# Patient Record
Sex: MALE | Race: WHITE | Employment: FULL TIME
[De-identification: names, ages, dates, MRNs, and addresses within clinical notes are randomized per-mention and may not be internally consistent; named-entity substitution may affect disease eponyms.]

---

## 2019-09-30 ENCOUNTER — HEALTH MAINTENANCE LETTER (OUTPATIENT)
Age: 44
End: 2019-09-30

## 2021-01-15 ENCOUNTER — HEALTH MAINTENANCE LETTER (OUTPATIENT)
Age: 46
End: 2021-01-15

## 2021-05-14 ENCOUNTER — HOSPITAL ENCOUNTER (EMERGENCY)
Facility: CLINIC | Age: 46
Discharge: HOME | End: 2021-05-14
Attending: EMERGENCY MEDICINE | Admitting: EMERGENCY MEDICINE
Payer: COMMERCIAL

## 2021-05-14 ENCOUNTER — APPOINTMENT (OUTPATIENT)
Dept: GENERAL RADIOLOGY | Facility: CLINIC | Age: 46
End: 2021-05-14
Attending: EMERGENCY MEDICINE
Payer: COMMERCIAL

## 2021-05-14 VITALS
HEART RATE: 80 BPM | HEIGHT: 67 IN | WEIGHT: 170 LBS | SYSTOLIC BLOOD PRESSURE: 110 MMHG | OXYGEN SATURATION: 98 % | TEMPERATURE: 96.2 F | DIASTOLIC BLOOD PRESSURE: 76 MMHG | BODY MASS INDEX: 26.68 KG/M2 | RESPIRATION RATE: 16 BRPM

## 2021-05-14 DIAGNOSIS — R07.89 CHEST DISCOMFORT: ICD-10-CM

## 2021-05-14 LAB
ALBUMIN SERPL-MCNC: 3.9 G/DL (ref 3.4–5)
ALP SERPL-CCNC: 114 U/L (ref 40–150)
ALT SERPL W P-5'-P-CCNC: 25 U/L (ref 0–70)
ANION GAP SERPL CALCULATED.3IONS-SCNC: 5 MMOL/L (ref 3–14)
AST SERPL W P-5'-P-CCNC: 15 U/L (ref 0–45)
BASOPHILS # BLD AUTO: 0 10E9/L (ref 0–0.2)
BASOPHILS NFR BLD AUTO: 0.2 %
BILIRUB SERPL-MCNC: 0.7 MG/DL (ref 0.2–1.3)
BUN SERPL-MCNC: 12 MG/DL (ref 7–30)
CALCIUM SERPL-MCNC: 9.2 MG/DL (ref 8.5–10.1)
CHLORIDE SERPL-SCNC: 106 MMOL/L (ref 94–109)
CO2 SERPL-SCNC: 28 MMOL/L (ref 20–32)
CREAT SERPL-MCNC: 1.06 MG/DL (ref 0.66–1.25)
D DIMER PPP FEU-MCNC: 0.4 UG/ML FEU (ref 0–0.5)
DIFFERENTIAL METHOD BLD: NORMAL
EOSINOPHIL # BLD AUTO: 0.1 10E9/L (ref 0–0.7)
EOSINOPHIL NFR BLD AUTO: 2.5 %
ERYTHROCYTE [DISTWIDTH] IN BLOOD BY AUTOMATED COUNT: 11.9 % (ref 10–15)
GFR SERPL CREATININE-BSD FRML MDRD: 85 ML/MIN/{1.73_M2}
GLUCOSE SERPL-MCNC: 105 MG/DL (ref 70–99)
HCT VFR BLD AUTO: 50 % (ref 40–53)
HGB BLD-MCNC: 17.1 G/DL (ref 13.3–17.7)
IMM GRANULOCYTES # BLD: 0 10E9/L (ref 0–0.4)
IMM GRANULOCYTES NFR BLD: 0.2 %
INTERPRETATION ECG - MUSE: NORMAL
INTERPRETATION ECG - MUSE: NORMAL
LIPASE SERPL-CCNC: 266 U/L (ref 73–393)
LYMPHOCYTES # BLD AUTO: 1.7 10E9/L (ref 0.8–5.3)
LYMPHOCYTES NFR BLD AUTO: 35.1 %
MCH RBC QN AUTO: 29.8 PG (ref 26.5–33)
MCHC RBC AUTO-ENTMCNC: 34.2 G/DL (ref 31.5–36.5)
MCV RBC AUTO: 87 FL (ref 78–100)
MONOCYTES # BLD AUTO: 0.3 10E9/L (ref 0–1.3)
MONOCYTES NFR BLD AUTO: 6.3 %
NEUTROPHILS # BLD AUTO: 2.7 10E9/L (ref 1.6–8.3)
NEUTROPHILS NFR BLD AUTO: 55.7 %
NRBC # BLD AUTO: 0 10*3/UL
NRBC BLD AUTO-RTO: 0 /100
PLATELET # BLD AUTO: 215 10E9/L (ref 150–450)
POTASSIUM SERPL-SCNC: 3.5 MMOL/L (ref 3.4–5.3)
PROT SERPL-MCNC: 7.8 G/DL (ref 6.8–8.8)
RBC # BLD AUTO: 5.73 10E12/L (ref 4.4–5.9)
SODIUM SERPL-SCNC: 139 MMOL/L (ref 133–144)
TROPONIN I SERPL-MCNC: <0.015 UG/L (ref 0–0.04)
WBC # BLD AUTO: 4.8 10E9/L (ref 4–11)

## 2021-05-14 PROCEDURE — 83690 ASSAY OF LIPASE: CPT | Performed by: EMERGENCY MEDICINE

## 2021-05-14 PROCEDURE — 80053 COMPREHEN METABOLIC PANEL: CPT | Performed by: EMERGENCY MEDICINE

## 2021-05-14 PROCEDURE — 85025 COMPLETE CBC W/AUTO DIFF WBC: CPT | Performed by: EMERGENCY MEDICINE

## 2021-05-14 PROCEDURE — 84484 ASSAY OF TROPONIN QUANT: CPT | Performed by: EMERGENCY MEDICINE

## 2021-05-14 PROCEDURE — 85379 FIBRIN DEGRADATION QUANT: CPT | Performed by: EMERGENCY MEDICINE

## 2021-05-14 PROCEDURE — 93005 ELECTROCARDIOGRAM TRACING: CPT

## 2021-05-14 PROCEDURE — 71046 X-RAY EXAM CHEST 2 VIEWS: CPT

## 2021-05-14 PROCEDURE — 93005 ELECTROCARDIOGRAM TRACING: CPT | Mod: 76

## 2021-05-14 PROCEDURE — 99285 EMERGENCY DEPT VISIT HI MDM: CPT | Mod: 25

## 2021-05-14 ASSESSMENT — MIFFLIN-ST. JEOR: SCORE: 1624.74

## 2021-05-14 NOTE — ED PROVIDER NOTES
"  History   Chief Complaint:  Chest Pain       HPI   Naun Francisco is a 45 year old male s/p lumbar microdiscectomy 3/17/21 with history of GERD who presents with a intermittent discomfort in his lower chest that started 3 days ago. He describes this as an \"electric zap\" that just lasts a second and goes away. He denies any specific triggers but notes that if he doesn't think about this he doesn't notice it. The patient has never experienced this before. He also reports that after his back surgery he was dealing with some abdominal issues which have resolved with Mylicon.  Denies any cough, fevers, or vomiting. Second dose Covid vaccine received 4 days ago. No history of blood clots. His grandfather had heart issues in his early 40s. No smoking or alcohol use.  No history of heart or lung problems.  No similar symptoms with exertion.  He currently feels fine.    Review of Systems   All other systems reviewed and are negative.      Allergies:  Barbiturates    Medications:  Neurontin  Pepcid  Mylicon    Past Medical History:    Hyperlipidemia  BRCA1 genetic carrier  Psoriasis  GERD    Past Surgical History:    Vasectomy    Family History:    His grandfather had heart issues in his early 40s.    Social History:  Patient presents to the ED alone.  No smoking or alcohol      Physical Exam     Patient Vitals for the past 24 hrs:   BP Temp Temp src Pulse Resp SpO2 Height Weight   05/14/21 0914 110/76 -- -- 80 16 98 % -- --   05/14/21 0903 113/76 -- -- 68 16 97 % -- --   05/14/21 0900 -- -- -- -- -- 97 % -- --   05/14/21 0845 -- -- -- -- -- 97 % -- --   05/14/21 0830 -- -- -- 64 16 98 % -- --   05/14/21 0815 -- -- -- 65 19 100 % -- --   05/14/21 0755 -- -- -- 106 23 100 % -- --   05/14/21 0750 115/82 -- -- 84 24 99 % -- --   05/14/21 0746 (!) 135/90 96.2  F (35.7  C) Temporal 89 16 -- 1.702 m (5' 7\") 77.1 kg (170 lb)       Physical Exam  General: Nontoxic-appearing male sitting upright in room 24  HENT: " mucous membranes moist  CV: rate as above, regular rhythm, no lower extremity edema, no JVD, palpable symmetric radial pulses  Resp: normal effort, speaks in full phrases, no stridor, no cough observed  GI: abdomen soft and nontender, no guarding, negative Rodriguez's sign  MSK: no bony tenderness to chest  Skin: appropriately warm and dry, no erythema or vesicles to chest wall  Neuro: alert, clear speech, oriented  Psych: cooperative    Emergency Department Course   ECG  ECG taken at 0739, ECG read at 0748  Normal sinus rhythm. Possible U waves   Rate 83 bpm. AK interval 122 ms. QRS duration 92 ms. QT/QTc 354/415 ms. P-R-T axes 46 57 46.    ECG #2  ECG taken at 0904, ECG read at 0905  Normal sinus rhythm.    Rate 65 bpm. AK interval 148 ms. QRS duration 96 ms. QT/QTc 394/409 ms. P-R-T axes -8 6 21.     Imaging:  XR Chest, G/E 2 views:   Heart size is normal. No pleural effusion, pneumothorax,  or abnormal area of consolidation. As per radiology.    Laboratory:   CBC: WBC: 4.8 , HGB: 17.1, PLT: 215    CMP: Glucose 105 (H) o/w WNL (Creatinine: 1.06)    Troponin (Collected 0755): <0.015    D-dimer: 0.4    Lipase: 266    Emergency Department Course:    Reviewed:  I reviewed nursing notes, vitals, past medical history and care everywhere    Assessments:  0754 I obtained history and examined the patient as noted above.     0908 I rechecked the patient and explained findings.     Disposition:  The patient was discharged to home.     Impression & Plan     Medical Decision Making:  His chest discomfort is so fleeting that I think would be highly unlikely to represent an acute coronary syndrome, and this is further supported by his work-up today including a negative troponin.  He is asymptomatic at this time.  Pulmonary embolism ruled out by D-dimer plus history and exam.  Vital signs are satisfactory.  Chest x-ray shows no pneumothorax, pneumonia, or edema.  Abdominal exam is benign, and while I considered upper abdominal  pathology such as pancreatitis, biliary colic, gastritis, I think that further emergent testing and treatment can be safely deferred.  Reassurance was provided to the patient while acknowledging the limitations of today's testing.  Return precautions reviewed along with recommendation for close follow-up.  He is very comfortable with this plan and had all questions answered prior to discharge.      Diagnosis:    ICD-10-CM    1. Chest discomfort  R07.89        Scribe Disclosure:  I, Chan Wiggins, am serving as a scribe at 7:49 AM on 5/14/2021 to document services personally performed by Paxton Green MD based on my observations and the provider's statements to me.     Disclaimer: This record was created at least in part using electronic voice recognition software, so please excuse any typographical errors.             Paxton Green MD  05/15/21 0620

## 2021-05-14 NOTE — ED TRIAGE NOTES
"Pt presents to triage ambulatory for evaluation of chest pain and  \"fluttering\" since Monday. Pt reports that it has been intermittent this week and states having tightness in chest with SOB with exertion. ABCs intact  "

## 2021-10-24 ENCOUNTER — HEALTH MAINTENANCE LETTER (OUTPATIENT)
Age: 46
End: 2021-10-24

## 2022-02-13 ENCOUNTER — HEALTH MAINTENANCE LETTER (OUTPATIENT)
Age: 47
End: 2022-02-13

## 2022-10-16 ENCOUNTER — HEALTH MAINTENANCE LETTER (OUTPATIENT)
Age: 47
End: 2022-10-16

## 2023-03-26 ENCOUNTER — HEALTH MAINTENANCE LETTER (OUTPATIENT)
Age: 48
End: 2023-03-26